# Patient Record
(demographics unavailable — no encounter records)

---

## 2024-10-22 NOTE — REASON FOR VISIT
[Follow-Up] : a follow-up visit [FreeTextEntry1] : type two DM female presents for painful skin lesions plantar aspect of both feet ,aggravated by weight bearing.

## 2024-10-22 NOTE — PHYSICAL EXAM
[Full Pulse] : the pedal pulses are present [Nail Clubbing] : no clubbing  or cyanosis of the fingernails [Musculoskeletal - Swelling] : no joint swelling seen [Motor Tone] : muscle strength and tone were normal [Diminished Throughout Right Foot] : normal tactile sensation with monofilament testing throughout right foot [Diminished Throughout Left Foot] : normal tactile sensation with monofilament testing throughout left foot [FreeTextEntry1] : hyperkeratotic skin lesions noted plantar aspect of both feet x 3, Sub met 1 L foot hyperkeratotic skin lesion with pin point bleeding

## 2024-10-22 NOTE — ASSESSMENT
[FreeTextEntry1] : Patient examined, history and chart reviewed Debridement of all diseased nails x 10 Plantar warts debridement x 3 down to dermal tissue Destruction and cauterization of plantar wart L foot Rx Salicylic acid  Patient tolerated procedure well patient educated on diabetic foot health and maintenance at length  Follow up in 1 month

## 2024-10-22 NOTE — HISTORY OF PRESENT ILLNESS
[FreeTextEntry1] : 64 year old female with history of type 2 DM is here today for painful skin lesions plantar aspect of both feet. Patient is also here for a diabetic foot check up. No tingling or burning but pain at the nails and skin borders due to thickness.  Patient complains of painful nails bilateral feet.

## 2024-11-18 NOTE — HISTORY OF PRESENT ILLNESS
[FreeTextEntry1] : follow up [de-identified] : 65yo female with PMH of DM type 2, HLD, HTN, and hypothyroidism who presents to the clinic today for routine follow up.

## 2024-11-18 NOTE — PHYSICAL EXAM
[No Acute Distress] : no acute distress [Normal Sclera/Conjunctiva] : normal sclera/conjunctiva [Normal Outer Ear/Nose] : the outer ears and nose were normal in appearance [No Lymphadenopathy] : no lymphadenopathy [No Respiratory Distress] : no respiratory distress  [Normal Rate] : normal rate  [No Edema] : there was no peripheral edema [Soft] : abdomen soft [Non Tender] : non-tender [Normal Posterior Cervical Nodes] : no posterior cervical lymphadenopathy [Normal Anterior Cervical Nodes] : no anterior cervical lymphadenopathy [No CVA Tenderness] : no CVA  tenderness [No Joint Swelling] : no joint swelling [Coordination Grossly Intact] : coordination grossly intact [Normal Affect] : the affect was normal

## 2024-11-18 NOTE — ASSESSMENT
[FreeTextEntry1] : 65yo female with PMH of DM type 2, HLD, HTN, and hypothyroidism who presents to the clinic today for routine follow up.  #Iron deficiency anemia -low iron stores and FOBT positive -advised patient to get the colonoscopy done. -referral to GI given  #DM -on tresiba 20 units, pioglitazone, and metformin -Farxiga and trulicity not covered by insurance -a1c is 6.6 -informed patient to follow up with Dr. Blas -follows with podiatry and ophthalmology -referral to Endocrinology given  #Hypothyrodism -TSH elevated, Free T4 normal -c/w levothyroxine 75mcg  #DL -on statin -lipids within normal limits  #Osteopenia -repeat dexa in 2 years -Oscal 1 tab twice daily  #HTN -controlled on Losartan  HCM -flu shot and tetanus booster given today -denied covid vaccines -Mammogram done in 2024; Birads 2 -Pap smear 2024 negative, f/u with dr. Owusu

## 2024-12-10 NOTE — ASSESSMENT
[FreeTextEntry1] : Patient examined, history and chart reviewed - Debridement of all diseased nails x 10 - Plantar callus debridement x 3 down to dermal tissue - Destruction and cauterization of plantar wart L foot Cont  Salicylic acid  Patient tolerated procedure well patient educated on diabetic foot health and maintenance at length  Follow up in 1 month

## 2024-12-10 NOTE — HISTORY OF PRESENT ILLNESS
[FreeTextEntry1] : 65 year old female with history of type 2 DM is here today for painful skin lesions plantar aspect of both feet. Patient is also here for a diabetic foot check up. No tingling or burning but pain at the nails and skin borders due to thickness.  Patient complains of painful nails bilateral feet.

## 2025-06-18 NOTE — HISTORY OF PRESENT ILLNESS
[FreeTextEntry1] : follow up [de-identified] : 65yo female with PMH of DM type 2, HLD, HTN, and hypothyroidism who presents to the clinic today for routine follow up. Denies chest pain, palpitations, N/V, abd pain or SOB.

## 2025-06-18 NOTE — PHYSICAL EXAM
[No Acute Distress] : no acute distress [Normal Sclera/Conjunctiva] : normal sclera/conjunctiva [Normal Outer Ear/Nose] : the outer ears and nose were normal in appearance [No Lymphadenopathy] : no lymphadenopathy [No Respiratory Distress] : no respiratory distress  [Normal Rate] : normal rate  [No Edema] : there was no peripheral edema [Soft] : abdomen soft [Non Tender] : non-tender [Normal Posterior Cervical Nodes] : no posterior cervical lymphadenopathy [Normal Anterior Cervical Nodes] : no anterior cervical lymphadenopathy [No CVA Tenderness] : no CVA  tenderness [No Joint Swelling] : no joint swelling [Coordination Grossly Intact] : coordination grossly intact [Normal Affect] : the affect was normal [Thyroid Normal, No Nodules] : the thyroid was normal and there were no nodules present

## 2025-06-18 NOTE — ASSESSMENT
[FreeTextEntry1] : 63yo female with PMH of DM type 2, HLD, HTN, and hypothyroidism who presents to the clinic today for routine follow up.  #Iron deficiency anemia -low iron stores and FOBT positive -seen GI 7/19/24, EGD/colonoscopy scheduled 11/14/24.  Pt. decided to canceled the colonoscopy  #DM - A1C: 6.9% 6/2025 -Continue tresiba 20 units, pioglitazone, and metformin -Farxiga and trulicity not covered by insurance -Follows Dr. Blas -follows with podiatry and ophthalmology -referral to Endocrinology given - Counseled on weight loss and diet.  #Hypothyrodism -TSH 4.94 elevated, Free T4 normal -c/w levothyroxine 75mcg  #DL -on statin -lipids within normal limits  #Osteopenia -repeat dexa in 2 years - Continue Oscal 1 tab twice daily  #HTN - BP:134/84 - Controlled on Losartan - Continue losartan   HCM - Colonoscopy referral provided. -flu shot and tetanus booster up to date -denied covid vaccines -Mammogram done in 2024; Birads 2; script given for Mammogram -Pap smear 2024 negative, f/u with dr. Owusu - F/u in 6 months